# Patient Record
Sex: MALE | Race: WHITE | NOT HISPANIC OR LATINO | URBAN - METROPOLITAN AREA
[De-identification: names, ages, dates, MRNs, and addresses within clinical notes are randomized per-mention and may not be internally consistent; named-entity substitution may affect disease eponyms.]

---

## 2018-09-27 VITALS
OXYGEN SATURATION: 96 % | DIASTOLIC BLOOD PRESSURE: 50 MMHG | DIASTOLIC BLOOD PRESSURE: 74 MMHG | TEMPERATURE: 98.8 F | DIASTOLIC BLOOD PRESSURE: 68 MMHG | HEART RATE: 81 BPM | DIASTOLIC BLOOD PRESSURE: 81 MMHG | OXYGEN SATURATION: 98 % | HEART RATE: 65 BPM | HEART RATE: 61 BPM | HEART RATE: 55 BPM | SYSTOLIC BLOOD PRESSURE: 117 MMHG | OXYGEN SATURATION: 97 % | SYSTOLIC BLOOD PRESSURE: 116 MMHG | HEART RATE: 57 BPM | SYSTOLIC BLOOD PRESSURE: 125 MMHG | SYSTOLIC BLOOD PRESSURE: 131 MMHG | HEART RATE: 54 BPM | OXYGEN SATURATION: 99 % | DIASTOLIC BLOOD PRESSURE: 65 MMHG | HEART RATE: 60 BPM | SYSTOLIC BLOOD PRESSURE: 142 MMHG | HEART RATE: 58 BPM | DIASTOLIC BLOOD PRESSURE: 75 MMHG | WEIGHT: 250 LBS | SYSTOLIC BLOOD PRESSURE: 132 MMHG | RESPIRATION RATE: 16 BRPM | TEMPERATURE: 96.2 F | RESPIRATION RATE: 14 BRPM | OXYGEN SATURATION: 94 % | DIASTOLIC BLOOD PRESSURE: 78 MMHG | SYSTOLIC BLOOD PRESSURE: 140 MMHG | DIASTOLIC BLOOD PRESSURE: 60 MMHG | RESPIRATION RATE: 15 BRPM | DIASTOLIC BLOOD PRESSURE: 66 MMHG | HEART RATE: 68 BPM | SYSTOLIC BLOOD PRESSURE: 121 MMHG

## 2018-09-28 ENCOUNTER — AMBULATORY SURGICAL CENTER (AMBULATORY)
Dept: URBAN - METROPOLITAN AREA SURGERY 17 | Facility: SURGERY | Age: 53
End: 2018-09-28
Payer: COMMERCIAL

## 2018-09-28 ENCOUNTER — OFFICE (AMBULATORY)
Dept: URBAN - METROPOLITAN AREA PATHOLOGY 4 | Facility: PATHOLOGY | Age: 53
End: 2018-09-28
Payer: COMMERCIAL

## 2018-09-28 DIAGNOSIS — D12.2 BENIGN NEOPLASM OF ASCENDING COLON: ICD-10-CM

## 2018-09-28 DIAGNOSIS — Z80.0 FAMILY HISTORY OF MALIGNANT NEOPLASM OF DIGESTIVE ORGANS: ICD-10-CM

## 2018-09-28 DIAGNOSIS — Z86.010 PERSONAL HISTORY OF COLONIC POLYPS: ICD-10-CM

## 2018-09-28 DIAGNOSIS — Z83.71 FAMILY HISTORY OF COLONIC POLYPS: ICD-10-CM

## 2018-09-28 DIAGNOSIS — K63.3 ULCER OF INTESTINE: ICD-10-CM

## 2018-09-28 DIAGNOSIS — K57.32 DIVERTICULITIS OF LARGE INTESTINE WITHOUT PERFORATION OR ABS: ICD-10-CM

## 2018-09-28 DIAGNOSIS — K57.30 DIVERTICULOSIS OF LARGE INTESTINE WITHOUT PERFORATION OR ABS: ICD-10-CM

## 2018-09-28 LAB
GI HISTOLOGY: A. SELECT: (no result)
GI HISTOLOGY: B. SELECT: (no result)
GI HISTOLOGY: PDF REPORT: (no result)

## 2018-09-28 PROCEDURE — 45380 COLONOSCOPY AND BIOPSY: CPT | Performed by: INTERNAL MEDICINE

## 2018-09-28 PROCEDURE — 88305 TISSUE EXAM BY PATHOLOGIST: CPT | Mod: 33 | Performed by: INTERNAL MEDICINE

## 2018-09-28 RX ORDER — CIPROFLOXACIN 500 MG/1
1000 TABLET, FILM COATED ORAL
Qty: 14 | Refills: 0 | Status: COMPLETED
Start: 2018-09-28 | End: 2023-09-29

## 2018-09-28 RX ORDER — METRONIDAZOLE 500 MG/1
1500 TABLET, FILM COATED ORAL
Qty: 21 | Refills: 0 | Status: COMPLETED
Start: 2018-09-28 | End: 2023-09-29

## 2018-09-28 NOTE — SERVICEHPINOTES
9/28/2018  This is an established patient in the practice who presents for a "bypass" colonoscopy for purposes of polyp surveillance, family history of colon polyp and family history of colon cancer.BRUpdated new patient forms reviewed.BR53 yo M with h/o gout, HTN?, colon polyps, diverticulosis. mother with colon polyps age 70s. Father with liver ca. Grandfather with colon ca age 70s. no GI symptomsPre-operatively, I reviewed the indication(s) for the procedure, the risks of the procedure [including but not limited to: unexpected bleeding possibly requiring hospitalization and/or an unplanned repeat colonoscopy, perforation possibly requiring surgical treatment, missed lesions and complications of sedation/MAC [also explained by anesthesia staff]. Since this patient has had prior colonoscopic and/or endoscopic procedures, these risks were familiar to the patient.  Multiple opportunities were provided for any questions or concerns, and all questions were answered satisfactorily before any anesthesia administered.

## 2023-09-26 VITALS
SYSTOLIC BLOOD PRESSURE: 94 MMHG | OXYGEN SATURATION: 98 % | HEART RATE: 66 BPM | DIASTOLIC BLOOD PRESSURE: 59 MMHG | DIASTOLIC BLOOD PRESSURE: 42 MMHG | DIASTOLIC BLOOD PRESSURE: 48 MMHG | WEIGHT: 260 LBS | DIASTOLIC BLOOD PRESSURE: 39 MMHG | DIASTOLIC BLOOD PRESSURE: 58 MMHG | SYSTOLIC BLOOD PRESSURE: 135 MMHG | SYSTOLIC BLOOD PRESSURE: 117 MMHG | RESPIRATION RATE: 13 BRPM | HEART RATE: 51 BPM | RESPIRATION RATE: 16 BRPM | OXYGEN SATURATION: 99 % | DIASTOLIC BLOOD PRESSURE: 50 MMHG | HEART RATE: 56 BPM | TEMPERATURE: 97 F | HEART RATE: 63 BPM | OXYGEN SATURATION: 97 % | SYSTOLIC BLOOD PRESSURE: 148 MMHG | HEART RATE: 61 BPM | RESPIRATION RATE: 11 BRPM | DIASTOLIC BLOOD PRESSURE: 82 MMHG | OXYGEN SATURATION: 96 % | HEART RATE: 54 BPM | OXYGEN SATURATION: 91 % | SYSTOLIC BLOOD PRESSURE: 132 MMHG | SYSTOLIC BLOOD PRESSURE: 85 MMHG | SYSTOLIC BLOOD PRESSURE: 96 MMHG | HEART RATE: 58 BPM | RESPIRATION RATE: 12 BRPM | RESPIRATION RATE: 18 BRPM | DIASTOLIC BLOOD PRESSURE: 78 MMHG | SYSTOLIC BLOOD PRESSURE: 89 MMHG | DIASTOLIC BLOOD PRESSURE: 77 MMHG | TEMPERATURE: 97.5 F | OXYGEN SATURATION: 95 % | HEART RATE: 57 BPM

## 2023-09-29 ENCOUNTER — OFFICE (AMBULATORY)
Dept: URBAN - METROPOLITAN AREA PATHOLOGY 4 | Facility: PATHOLOGY | Age: 58
End: 2023-09-29
Payer: COMMERCIAL

## 2023-09-29 ENCOUNTER — AMBULATORY SURGICAL CENTER (AMBULATORY)
Dept: URBAN - METROPOLITAN AREA SURGERY 17 | Facility: SURGERY | Age: 58
End: 2023-09-29
Payer: COMMERCIAL

## 2023-09-29 DIAGNOSIS — K52.9 NONINFECTIVE GASTROENTERITIS AND COLITIS, UNSPECIFIED: ICD-10-CM

## 2023-09-29 DIAGNOSIS — Z86.010 PERSONAL HISTORY OF COLONIC POLYPS: ICD-10-CM

## 2023-09-29 DIAGNOSIS — K63.5 POLYP OF COLON: ICD-10-CM

## 2023-09-29 DIAGNOSIS — K57.30 DIVERTICULOSIS OF LARGE INTESTINE WITHOUT PERFORATION OR ABS: ICD-10-CM

## 2023-09-29 DIAGNOSIS — Z09 ENCOUNTER FOR FOLLOW-UP EXAMINATION AFTER COMPLETED TREATMEN: ICD-10-CM

## 2023-09-29 DIAGNOSIS — Z83.71 FAMILY HISTORY OF COLONIC POLYPS: ICD-10-CM

## 2023-09-29 DIAGNOSIS — K63.89 OTHER SPECIFIED DISEASES OF INTESTINE: ICD-10-CM

## 2023-09-29 LAB
GI HISTOLOGY: A. UNSPECIFIED: (no result)
GI HISTOLOGY: B. UNSPECIFIED: (no result)
GI HISTOLOGY: PDF REPORT: (no result)

## 2023-09-29 PROCEDURE — 88305 TISSUE EXAM BY PATHOLOGIST: CPT | Performed by: INTERNAL MEDICINE

## 2023-09-29 PROCEDURE — 45380 COLONOSCOPY AND BIOPSY: CPT | Mod: 33 | Performed by: INTERNAL MEDICINE

## 2023-09-29 NOTE — SERVICEHPINOTES
BLUE DENNIS  is a  58  male   who presents today for a  Colonoscopy   for   the indications listed below. The updated Patient Profile was reviewed prior to the procedure, in conjunction with the Physical Exam, including medical conditions, surgical procedures, medications, allergies, family history and social history. See Physical Exam time stamp below for date and time of HPI completion.Pre-operatively, I reviewed the indication(s) for the procedure, the risks of the procedure [including but not limited to: unexpected bleeding possibly requiring hospitalization and/or unplanned repeat procedures, perforation possibly requiring surgical treatment, missed lesions and complications of sedation/MAC (also explained by anesthesia staff)]. I have evaluated the patient for risks associated with the planned anesthesia and the procedure to be performed and find the patient an acceptable candidate for IV sedation.Multiple opportunities were provided for any questions or concerns, and all questions were answered satisfactorily before any anesthesia was administered. We will proceed with the planned procedure.br

## 2024-06-10 NOTE — PROGRESS NOTES
New Knee      Patient: Harvey Llanes        YOB: 1965    Medical Record Number: [id number]        Chief Complaints:   Left knee pain    History of Present Illness: This is a 59-year-old male who presents complaining of left knee pain he states has been ongoing about 4 weeks its mostly stiff if he sits for period of time he gets up at stiff it is primarily anterior no history of injury but he notices a lot when he is playing golf no night pain again his pain is primarily anterior        Allergies: No Known Allergies    Medications:   Home Medications:  Current Outpatient Medications on File Prior to Visit   Medication Sig    allopurinol (ZYLOPRIM) 300 MG tablet Take 1 tablet by mouth Daily.    hydroCHLOROthiazide 25 MG tablet Take 1 tablet by mouth Daily.    multivitamin with minerals tablet tablet Take 1 tablet by mouth Daily.    potassium chloride 10 MEQ CR tablet Take 1 tablet by mouth Daily.    vitamin B-12 (CYANOCOBALAMIN) 1000 MCG tablet Take 1 tablet by mouth Daily.     No current facility-administered medications on file prior to visit.     Current Medications:  Scheduled Meds:  Continuous Infusions:No current facility-administered medications for this visit.    PRN Meds:.    Past Medical History:   Diagnosis Date    Tendinitis of knee 2012    Patellar Tendon      No past surgical history on file.     Social History     Occupational History    Not on file   Tobacco Use    Smoking status: Never    Smokeless tobacco: Not on file   Substance and Sexual Activity    Alcohol use: Not Currently     Alcohol/week: 1.0 standard drink of alcohol     Types: 1 Cans of beer per week    Drug use: Never    Sexual activity: Not Currently     Partners: Female     Birth control/protection: None, Same-sex partner      Social History     Social History Narrative    Not on file        Family History   Problem Relation Age of Onset    Cancer Father         86 years old             Review of Systems:     Review of  "Systems      Physical Exam: 59 y.o. male  General Appearance:    Alert, cooperative, in no acute distress                   Vitals:    06/11/24 1251   Temp: 98.6 °F (37 °C)   Weight: 121 kg (267 lb 8 oz)   Height: 185.4 cm (73\")   PainSc:   7      Patient is alert and read ×3 no acute distress appears her above-listed at height weight and age.  Affect is normal respiratory rate is normal unlabored. Heart rate regular rate rhythm, sclera, dentition and hearing are normal for the purpose of this exam.        Ortho Exam  Physical exam of the left knee reveals no effusion, no erythema.  The patient has no palpable tenderness along the medial joint line, no tenderness about the lateral joint line.  Patient does have crepitus with patellofemoral range of motion.  They also have subjective symptoms anteriorly during exam.  The patient has a negative bounce home, negative Michael and a stable ligamentous exam.  Quad tone is reasonable and symmetric.  There are no overlying skin changes no lymphedema no lymphadenopathy.  There is good hip range of motion which is full symmetric and asymptomatic and a normal ankle exam.  Hamstrings and IT band are tight bilaterally.   Large Joint Arthrocentesis: L knee  Date/Time: 6/11/2024 1:22 PM  Consent given by: patient  Site marked: site marked  Timeout: Immediately prior to procedure a time out was called to verify the correct patient, procedure, equipment, support staff and site/side marked as required   Supporting Documentation  Indications: pain   Procedure Details  Location: knee - L knee  Preparation: Patient was prepped and draped in the usual sterile fashion  Needle gauge: 21G.  Medications administered: 1 mL methylPREDNISolone acetate 80 MG/ML; 2 mL lidocaine PF 1% 1 %  Patient tolerance: patient tolerated the procedure well with no immediate complications                 Radiology:   AP, Lateral and merchant views of the left knee  were ordered/reviewed to evauateknee pain. " He has good maintenance with joint space medially and laterally he has moderate patellofemoral OA he has a benign lesion in the distal aspect of his left femur have no comparative films  Imaging Results (Most Recent)       Procedure Component Value Units Date/Time    XR Knee 3 View Left [[id number]] Resulted: 06/11/24 1242     Updated: 06/11/24 1242    Impression:      Ordering physician's impression is located in the Encounter Note dated 06/11/24. X-ray performed in the DR room.            Assessment/Plan:    Left knee pain I think this is mostly patellofemoral although meniscal pathology remains in my differential I think it is lower though.  Plan is to proceed with an injection as a diagnostic and therapeutic tool I will start him on some meloxicam with strict precautions for short period of time and then as important I think is to have him see physical therapy working on quad and core strengthening we talked about maintaining an ideal body weight should he fail this would consider other means of testing to rule out meniscus tear

## 2024-06-11 ENCOUNTER — OFFICE VISIT (OUTPATIENT)
Dept: ORTHOPEDIC SURGERY | Facility: CLINIC | Age: 59
End: 2024-06-11
Payer: COMMERCIAL

## 2024-06-11 VITALS — WEIGHT: 267.5 LBS | HEIGHT: 73 IN | TEMPERATURE: 98.6 F | BODY MASS INDEX: 35.45 KG/M2

## 2024-06-11 DIAGNOSIS — M17.10 PATELLOFEMORAL ARTHRITIS: ICD-10-CM

## 2024-06-11 DIAGNOSIS — R52 PAIN: Primary | ICD-10-CM

## 2024-06-11 RX ORDER — POTASSIUM CHLORIDE 750 MG/1
10 TABLET, FILM COATED, EXTENDED RELEASE ORAL DAILY
COMMUNITY
Start: 2024-05-06

## 2024-06-11 RX ORDER — HYDROCHLOROTHIAZIDE 25 MG/1
25 TABLET ORAL DAILY
COMMUNITY
Start: 2024-05-06

## 2024-06-11 RX ORDER — MULTIPLE VITAMINS W/ MINERALS TAB 9MG-400MCG
1 TAB ORAL DAILY
COMMUNITY

## 2024-06-11 RX ORDER — ALLOPURINOL 300 MG/1
300 TABLET ORAL DAILY
COMMUNITY
Start: 2024-05-06

## 2024-06-11 RX ORDER — LANOLIN ALCOHOL/MO/W.PET/CERES
1000 CREAM (GRAM) TOPICAL DAILY
COMMUNITY

## 2024-06-11 RX ORDER — MELOXICAM 15 MG/1
TABLET ORAL
Qty: 30 TABLET | Refills: 0 | Status: SHIPPED | OUTPATIENT
Start: 2024-06-11

## 2024-06-11 RX ADMIN — LIDOCAINE HYDROCHLORIDE 2 ML: 10 INJECTION, SOLUTION EPIDURAL; INFILTRATION; INTRACAUDAL; PERINEURAL at 13:22

## 2024-06-11 RX ADMIN — METHYLPREDNISOLONE ACETATE 1 ML: 80 INJECTION, SUSPENSION INTRA-ARTICULAR; INTRALESIONAL; INTRAMUSCULAR; SOFT TISSUE at 13:22

## 2024-06-12 RX ORDER — LIDOCAINE HYDROCHLORIDE 10 MG/ML
2 INJECTION, SOLUTION EPIDURAL; INFILTRATION; INTRACAUDAL; PERINEURAL
Status: COMPLETED | OUTPATIENT
Start: 2024-06-11 | End: 2024-06-11

## 2024-06-12 RX ORDER — METHYLPREDNISOLONE ACETATE 80 MG/ML
1 INJECTION, SUSPENSION INTRA-ARTICULAR; INTRALESIONAL; INTRAMUSCULAR; SOFT TISSUE
Status: COMPLETED | OUTPATIENT
Start: 2024-06-11 | End: 2024-06-11

## 2024-06-27 NOTE — PROGRESS NOTES
Physical Therapy Initial Evaluation and Plan of Care    [facility] [place] [address]  [phone] (phone)  [phone] (fax)      Patient: Harvey Llanes   : 1965  Diagnosis/ICD-10 Code:  Patellar tendon strain, left, subsequent encounter [S86.812D]  Referring practitioner: Kisha Bowden MD  Date of Initial Visit: 2024  Today's Date: 2024  Patient seen for 1 sessions    Visit Diagnoses:    ICD-10-CM ICD-9-CM   1. Patellar tendon strain, left, subsequent encounter  S86.812D V58.89     844.8   2. Acute pain of left knee  M25.562 719.46              Subjective Evaluation    History of Present Illness  Date of surgery: 2024  Mechanism of injury: Pt presents to physical therapy with c/o L knee pain, which began in early May. About 4-5 years ago, pt was having pain in the front of his knee. He got diagnosed with patellar tendon strain. He was given a shot in the front of his knee which helped tremendously. He didn't have issues for years. Then, around 2 months ago, pt was playing golf and over time the pain in his L knee started increasing. After he golfed, he sat for a long period of time (15-20 min) driving which made his knee stiff. Now, he feels when he sits for a longer period of time with knee bend, his knee gets stiff and feels like it needs to pop. He can get it to pop by straightening his knee and/or getting up--which is painful (sharp) but then it goes away. The pain is located inferomedial knee. Denies buckling or weakness. The pain is aggravated with sitting for a long period of time, twisting with golf swing, and some soreness with yard work. Denies issues with going up and down stairs. It is eased by Voltaren. He was also given a shot which eased his pain. Denies N&T.    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  Progression: improved    Social Support  Lives in: multiple-level home  Lives with: spouse    Diagnostic Tests  X-ray: abnormal (OA)    Treatments  Previous treatment: injection treatment  Current treatment: physical therapy  Patient Goals  Patient goals for therapy: decreased pain, increased strength and return to sport/leisure activities  Patient goal: being able to comfortably and confidently hit a golf ball           Objective          Strength/Myotome Testing     Left Hip   Planes of Motion   Flexion: 5  Extension: 4+  Abduction: 4+    Right Hip   Planes of Motion   Flexion: 4+  Extension: 4  Abduction: 4+    Left Knee   Flexion: 4+  Extension: 4+ (a little bit pain in knee)    Right Knee   Flexion: 4  Extension: 5    Left Ankle/Foot   Dorsiflexion: 5    Right Ankle/Foot   Dorsiflexion: 5    Left Knee Flexibility Comments:   Mod to sev stiffness of hamstring and quad B     Ambulation     Comments   Pt demonstrates R lateral trunk lean, some compensated R sided trendelenberg, B genu varus        See Exercise, Manual, and Modality Logs for complete treatment.       Functional Outcome Score: KOS-ADL: 44% functional ability (56% disability)    Performed following exercises and instructed pt in proper form for HEP:  HS stretch green strap 30 sec x2 B  Quad stretch green strap 30 sec x2 B    Instructed pt to wear brace and ice daily.    Assessment & Plan       Assessment  Impairments: abnormal gait, abnormal or restricted ROM, activity intolerance, impaired balance, impaired physical strength, lacks appropriate home exercise program and pain with function   Functional limitations: sleeping, walking, uncomfortable because of pain, moving  in bed, standing and stooping   Assessment details: Pt is a 59 y.o. male who presents to physical therapy with c/o acute L knee pain.  he presents with a evolving (improving) clinical presentation. he has comorbidities of hx of lumbar pain and personal factors of having a sedentary lifestyle that may affect his progress in the plan of care. Signs and symptoms are consistent with physical therapy diagnosis of L patellar tendon strain. Pt also demonstrates BLE flexibility deficits, BLE weakness, gait deviation, crepitus/popping of L knee, and pain/stiffness with prolonged sitting.  Prognosis: good    Goals  Plan Goals: STGs to be completed in 30 days  1. Pt will demonstrate independence with home exercise program for knee strengthening/mobility.  2. Pt will demonstrate improved gross LE strength to at least a 5/5 in order to promote decreased stress/tension through patellar tendon.      LTGs to be completed in 90 days  1. Pt will improve score on KOS-ADL by 20% function (64% functional ability) in order to demonstrate ease with ADLs including navigating stairs and ambulation.  3. Pt will be able to comfortably and confidently hit a golf ball at 100% effort with less than 2/10 knee pain in order to facilitate return to golf for recreation.     Plan  Therapy options: will be seen for skilled therapy services  Planned modality interventions: thermotherapy (hydrocollator packs), TENS, cryotherapy, ultrasound and high voltage pulsed current (dermal wound therapy)  Planned therapy interventions: abdominal trunk stabilization, manual therapy, neuromuscular re-education, ADL retraining, flexibility, functional ROM exercises, strengthening, therapeutic activities, gait training, home exercise program and transfer training  Frequency: 2x week  Duration in weeks: 12  Treatment plan discussed with: patient        Timed:         Manual Therapy:    0     mins  55311;     Therapeutic Exercise:    0     mins  19198;     Neuromuscular  James:    0    mins  51822;    Therapeutic Activity:     10     mins  80985;     Gait Trainin     mins  25941;     Ultrasound:     0     mins  57883;    Self Care                       0     mins   03166      Un-Timed:  Electrical Stimulation:    0     mins  61734 ( );  Dry Needling  (1-2 muscles)            0     mins 80482 (Self-pay)  Dry Needling (3-4 muscles) 0     mins 48905 (Self-pay)  Dry Needling Trial    0     mins DRYNDLTRIAL  (No Charge)  Traction     0     mins 00078  Low Eval     0     Mins  83605  Mod Eval     33     Mins  60066  High Eval                       0     Mins  73887    Timed Treatment:   10   mins   Total Treatment:     43   mins    PT SIGNATURE: [place] License Number: [id number]    Electronically signed by Rachel Estrada PT, 24, 10:29 AM EDT    DATE TREATMENT INITIATED: 2024    Initial Certification  Certification Period: 2024  I certify that the therapy services are furnished while this patient is under my care.  The services outlined above are required by this patient, and will be reviewed every 90 days.     PHYSICIAN: Kisha Bowden MD   NPI: [id number]                                         DATE:     Please sign and return via fax to [phone] Thank you, [facility].

## 2024-06-28 ENCOUNTER — TREATMENT (OUTPATIENT)
Dept: PHYSICAL THERAPY | Facility: CLINIC | Age: 59
End: 2024-06-28
Payer: COMMERCIAL

## 2024-06-28 DIAGNOSIS — S86.812D PATELLAR TENDON STRAIN, LEFT, SUBSEQUENT ENCOUNTER: Primary | ICD-10-CM

## 2024-06-28 DIAGNOSIS — M25.562 ACUTE PAIN OF LEFT KNEE: ICD-10-CM

## 2024-07-01 ENCOUNTER — TELEPHONE (OUTPATIENT)
Dept: PHYSICAL THERAPY | Facility: CLINIC | Age: 59
End: 2024-07-01
Payer: COMMERCIAL

## 2024-07-01 NOTE — TELEPHONE ENCOUNTER
Caller: Harvey Llanes    Relationship: Self       What was the call regarding: IS CX THE APPT FOR THIS FRIDAY WANTED TO ADD IT ON TO THE END, WE WERE TRYING 8-28 THE WED  IT WILL NOT LET ME SCHEDULE, CAN YOU PLEASE SCHEDULE, IF AN ISSUE CALL HIM. TRIED TO CALL OFFICE

## 2024-07-20 NOTE — PROGRESS NOTES
"Physical Therapy Daily Treatment Note    [facility]  [address] [place] [address]  [phone] (phone)  [phone] (fax)    Patient: Harvey Llanes   : 1965  Diagnosis/ICD-10 Code:  Patellar tendon strain, left, subsequent encounter [S86.812D]  Referring practitioner: Kisha Bowden MD  Today's Date: 2024  Patient seen for 2 sessions    Visit Diagnoses:    ICD-10-CM ICD-9-CM   1. Patellar tendon strain, left, subsequent encounter  S86.812D V58.89     844.8   2. Acute pain of left knee  M25.562 719.46              Subjective   Pt states that he tweaked his back a couple of days after the eval. He said his knee feels tight today because he did yard work yesterday using a \"roto-tiller\".     Objective   See Exercise, Manual, and Modality Logs for complete treatment.     THEREX/THERACT/NEURO  - Ambulation x2 laps  - Clamshells RTB 3x10  - SAQ black ankle weight 3 sec hold ecc phase x10 B  - SLRs w/ quad set 2x10  - Bridges 2x10       Assessment/Plan  Today was pt's first treatment session following initial eval. Instructed pt in proper form for all exercises. Pt was given printout of new HEP and instructed in appropriate frequency + form. Continue PT POC.          Timed:    Manual Therapy:    0     mins  01960;  Therapeutic Exercise:    23     mins  39352;     Neuromuscular James:    10    mins  87168;    Therapeutic Activity:     10     mins  54483;     Gait Trainin     mins  55940;     Ultrasound:     0     mins  41070;    Aquatic Therapy    0     mins 98740;  Self Care                       0     mins   42114        Untimed:  Electrical Stimulation:    0     mins  44173 (MC );  Traction:    0     mins  32192;   Dry Needling  (1-2 muscles)            0     mins  (Self-pay)  Dry Needling (3-4 muscles) 0      (Self-pay)  Dry Needling Trial    0     DRYNDLTRIAL  (No Charge)    Timed Treatment:   43   mins   Total Treatment:     43   " heather Estrada, PT  Physical Therapist    [place] License:[id number]

## 2024-07-22 ENCOUNTER — TREATMENT (OUTPATIENT)
Dept: PHYSICAL THERAPY | Facility: CLINIC | Age: 59
End: 2024-07-22
Payer: COMMERCIAL

## 2024-07-22 DIAGNOSIS — S86.812D PATELLAR TENDON STRAIN, LEFT, SUBSEQUENT ENCOUNTER: Primary | ICD-10-CM

## 2024-07-22 DIAGNOSIS — M25.562 ACUTE PAIN OF LEFT KNEE: ICD-10-CM

## 2024-07-22 PROCEDURE — 97110 THERAPEUTIC EXERCISES: CPT

## 2024-07-22 PROCEDURE — 97530 THERAPEUTIC ACTIVITIES: CPT

## 2024-07-22 PROCEDURE — 97112 NEUROMUSCULAR REEDUCATION: CPT

## 2024-07-26 ENCOUNTER — TREATMENT (OUTPATIENT)
Dept: PHYSICAL THERAPY | Facility: CLINIC | Age: 59
End: 2024-07-26
Payer: COMMERCIAL

## 2024-07-26 DIAGNOSIS — S86.812D PATELLAR TENDON STRAIN, LEFT, SUBSEQUENT ENCOUNTER: Primary | ICD-10-CM

## 2024-07-26 DIAGNOSIS — M25.562 ACUTE PAIN OF LEFT KNEE: ICD-10-CM

## 2024-07-26 NOTE — PROGRESS NOTES
Physical Therapy Daily Treatment Note    [facility]  [address] [place] [address]  [phone] (phone)  [phone] (fax)    Patient: Harvey Llanes   : 1965  Diagnosis/ICD-10 Code:  Patellar tendon strain, left, subsequent encounter [S86.812D]  Referring practitioner: Kisha Bowden MD  Today's Date: 2024  Patient seen for 3 sessions    Visit Diagnoses:    ICD-10-CM ICD-9-CM   1. Patellar tendon strain, left, subsequent encounter  S86.812D V58.89     844.8   2. Acute pain of left knee  M25.562 719.46              Subjective   Pt states he wasn't sore or in pain after his first session.     Objective   See Exercise, Manual, and Modality Logs for complete treatment.     THEREX/THERACT/NEURO  - Recumbent bike x5 min  - Leg press K 120# 2x10 140# x10  - K Hip ABD 60# x10, 65# x10, 70# x10  - Step Ups, 1 step, forward and lat  - Heel slides x10--discussed doing while sitting throughout day to reduce tightness/popping that occurs with prolonged sitting  - SLRs w/ quad set x10, black weight 2x10   - Quad stretch 30 sec x2 w/ green strap    Defer  - Clamshells RTB 3x10  - SAQ black ankle weight 3 sec hold ecc phase x10 B  - Bridges 2x10    Assessment/Plan  Pt tolerated session well overall, reporting an appropriate level of muscle fatigue following strength exercises. Added resistance machines to further strengthen glute max, glute med, and quads. Also discussed adding heel slides when sitting for a long period of time to reduce stiffness in knee and decrease crepitus. Continue PT POC.          Timed:    Manual Therapy:    0     mins  86750;  Therapeutic Exercise:    8     mins  01580;     Neuromuscular James:    23    mins  93401;    Therapeutic Activity:     10     mins  91780;     Gait Trainin     mins  90714;     Ultrasound:     0     mins  70123;    Aquatic Therapy    0     mins 89631;  Self Care                       0     mins    74296        Untimed:  Electrical Stimulation:    0     mins  17886 ( );  Traction:    0     mins  74728;   Dry Needling  (1-2 muscles)            0     mins 20560 (Self-pay)  Dry Needling (3-4 muscles) 0     20561 (Self-pay)  Dry Needling Trial    0     DRYNDLTRIAL  (No Charge)    Timed Treatment:   41   mins   Total Treatment:     41   mins    Rachel Estrada PT  Physical Therapist    [place] License:[id number]

## 2024-07-29 ENCOUNTER — TREATMENT (OUTPATIENT)
Dept: PHYSICAL THERAPY | Facility: CLINIC | Age: 59
End: 2024-07-29
Payer: COMMERCIAL

## 2024-07-29 DIAGNOSIS — M25.562 ACUTE PAIN OF LEFT KNEE: ICD-10-CM

## 2024-07-29 DIAGNOSIS — S86.812D PATELLAR TENDON STRAIN, LEFT, SUBSEQUENT ENCOUNTER: Primary | ICD-10-CM

## 2024-07-29 PROCEDURE — 97110 THERAPEUTIC EXERCISES: CPT

## 2024-07-29 PROCEDURE — 97112 NEUROMUSCULAR REEDUCATION: CPT

## 2024-07-29 PROCEDURE — 97530 THERAPEUTIC ACTIVITIES: CPT

## 2024-07-29 NOTE — PROGRESS NOTES
Physical Therapy Daily Treatment Note    [facility]  [address] [place] [address]  [phone] (phone)  [phone] (fax)    Patient: Harvey Llanes   : 1965  Diagnosis/ICD-10 Code:  Patellar tendon strain, left, subsequent encounter [S86.812D]  Referring practitioner: Kisha Bowden MD  Today's Date: 2024  Patient seen for 4 sessions    Visit Diagnoses:    ICD-10-CM ICD-9-CM   1. Patellar tendon strain, left, subsequent encounter  S86.812D V58.89     844.8   2. Acute pain of left knee  M25.562 719.46              Subjective   Pt reports that he hasn't had pain     Objective   See Exercise, Manual, and Modality Logs for complete treatment.     THEREX/THERACT/NEURO  - Recumbent bike x5 min  - Leg press K 120# 2x10 140# x10  - K Hip ABD 60# x10, 65# x10, 70# x10  - Step Ups, 1 step, forward and lat  - Clamshells GTB 3x10  - Bridges 2x10  - Sidelying hip ABD--w/ IR of leg, x8  - Figure 4 AND piriformis stretch, 20 sec x2 B      Defer  - SAQ black ankle weight 3 sec hold ecc phase x10 B  - Heel slides x10--discussed doing while sitting throughout day to reduce tightness/popping that occurs with prolonged sitting  - SLRs w/ quad set x10, black weight 2x10   - Quad stretch 30 sec x2 w/ green strap    Assessment/Plan  Pt tolerated session well overall, reporting an appropriate level of muscle fatigue following strength exercises. Added hip ABD with IR of LE to strengthen glute med. Required min vcs to lead with heel and keep leg straight, which he was able to correct. Pan to add STS and teach segmental bridges next session. Continue PT POC.          Timed:    Manual Therapy:    0     mins  23031;  Therapeutic Exercise:    23     mins  74857;     Neuromuscular James:    10    mins  31729;    Therapeutic Activity:     10     mins  82930;     Gait Trainin     mins  11105;     Ultrasound:     0     mins  21905;    Aquatic Therapy    0     mins  12724;  Self Care                       0     mins   18303        Untimed:  Electrical Stimulation:    0     mins  97817 ( );  Traction:    0     mins  82936;   Dry Needling  (1-2 muscles)            0     mins 20560 (Self-pay)  Dry Needling (3-4 muscles) 0     20561 (Self-pay)  Dry Needling Trial    0     DRYNDLTRIAL  (No Charge)    Timed Treatment:   43   mins   Total Treatment:     43   mins    Rachel Estrada PT  Physical Therapist    [place] License:[id number]

## 2024-08-02 ENCOUNTER — TELEPHONE (OUTPATIENT)
Dept: PHYSICAL THERAPY | Facility: CLINIC | Age: 59
End: 2024-08-02

## 2024-08-05 NOTE — PROGRESS NOTES
Physical Therapy Daily Note    Patient Name: Harvey Llanes         :  1965  Referring Physician: Kisha Bowden MD  Treatment Date: 2024  Date of Initial Visit: No linked episodes    Visit Diagnoses:    ICD-10-CM ICD-9-CM   1. Patellar tendon strain, left, subsequent encounter  S86.812D V58.89     844.8   2. Acute pain of left knee  M25.562 719.46     Patient seen for Visit count could not be calculated. Make sure you are using a visit which is associated with an episode. sessions  _____________________________________________________    Subjective   Harvey Llanes reports: knee more (L) knee after a lot of yard work over weekend -  infrapatella into medial patella - popping after prolonged sitting when extending his knee - used to be painful, but not so much after injection -   H/o PAT tendon issues in past -     Objective   Tender patellar tendon into insertion -     TREATMENT:   MANUAL THERAPY:   STM  / DTM to patellar tendon and insertion (L) knee -     EXERCISES:   []   - Clamshells GTB 3x10  []   - Bridges 2x10  []   - Sidelying hip ABD--w/ IR of leg, x8  []   - Figure 4 AND piriformis stretch, 20 sec x2 B  [x]   - NUSTEP  [x]   - Leg press BEREKET 165 2x 15  [x]   - BEREKET Hip ABD 70# 2x 15  [x]   - RUNNER Step Ups, Hole 4 from bottom  [x]   - LATERAL STEP UP w/ ABDuction Hole 3 from bottom w/ facilitation to glute medius  [x]   -SLS 2 x 1 min ea  []   - BALANCE BOARD Frnt/Bk; Side/Side      NMR: Verbal and tactile cues to facilitate quad/VMO, Glute, Hip / LE musculature statically and dynamically. - Balance / proprioception activities -  -      Functional / Therapeutic Activities:    TAPING / BRACING: DISCUSSED PATELLAR TENDON STRAP and WEARING SCHEDULE  SEE EXERCISE FLOW SHEET -   Knee Assessment -       Assessment/Plan  60 yo male; (L) knee pain / patellar tendonitis  Improving overall -       Progress strengthening /stabilization /functional activity        _________________________________________________    Manual Therapy:            05     mins  40849;  Therapeutic Exercise:    10    mins  02265;     Neuromuscular James:   08     mins  83251;    Therapeutic Activity:     23      mins  19167;     Ultrasound:                          mins  22124;  Iontophoresis:                     mins  53912;    Electrical Stimulation:         mins  64965 ( );  Mechanical Traction:          mins  20099  Dry Needling                       mins self-pay     Timed Treatment:   46    mins                  Total Treatment:     46    mins        Harsh Kim, PT  Physical Therapist  Lic # [id number]    Access Code: [email or web address]  URL: [email or web address]  Date: 08/06/2024  Prepared by: Lakhwinder Kim    Exercises  - Modified Rafael Stretch  - 3 x daily - 7 x weekly - 1 sets - 2 reps - 1 min hold  - Supine Quadriceps Stretch with Strap on Table  - 1 x daily - 7 x weekly - 1 sets - 2 reps - 60 hold

## 2024-08-06 ENCOUNTER — TREATMENT (OUTPATIENT)
Dept: PHYSICAL THERAPY | Facility: CLINIC | Age: 59
End: 2024-08-06
Payer: COMMERCIAL

## 2024-08-06 DIAGNOSIS — M25.562 ACUTE PAIN OF LEFT KNEE: ICD-10-CM

## 2024-08-06 DIAGNOSIS — S86.812D PATELLAR TENDON STRAIN, LEFT, SUBSEQUENT ENCOUNTER: Primary | ICD-10-CM

## 2024-08-08 ENCOUNTER — TREATMENT (OUTPATIENT)
Dept: PHYSICAL THERAPY | Facility: CLINIC | Age: 59
End: 2024-08-08
Payer: COMMERCIAL

## 2024-08-08 DIAGNOSIS — S86.812D PATELLAR TENDON STRAIN, LEFT, SUBSEQUENT ENCOUNTER: Primary | ICD-10-CM

## 2024-08-08 DIAGNOSIS — M25.562 ACUTE PAIN OF LEFT KNEE: ICD-10-CM

## 2024-08-08 NOTE — PROGRESS NOTES
Physical Therapy Daily Note    Patient Name: Harvey Llanes         :  1965  Referring Physician: Kisha Bowden MD  Treatment Date: 2024  Date of Initial Visit: No linked episodes    Visit Diagnoses:    ICD-10-CM ICD-9-CM   1. Patellar tendon strain, left, subsequent encounter  S86.812D V58.89     844.8   2. Acute pain of left knee  M25.562 719.46     Patient seen for Visit count could not be calculated. Make sure you are using a visit which is associated with an episode. sessions  _____________________________________________________    Subjective   Harvey Llanes reports: improved strength and improved pain about 15-20% - Increased after activities - including yard work, etc - Upstairs > down stairs -   DTM helpful - stretching helpful   Pain at infrapatellar region / pat tendon region (L) - Notes intermittent popping of knee cap (L)     Objective   Tender infrapatellar region, pat tendon and into insertion - Inf tipped patella (L)     Strength/Myotome Testing      Left Hip   Planes of Motion   Flexion: 5  Extension: 4+  Abduction: 4+     Right Hip   Planes of Motion   Flexion: 4+  Extension: 4+  Abduction: 4+     Left Knee   Flexion: 4+  Extension: 4+ (a little bit pain in knee)     Right Knee   Flexion: 4+  Extension: 5    TREATMENT:   MANUAL THERAPY:   STM  / DTM to patellar tendon and insertion (L) knee -      EXERCISES:   []   - Clamshells GTB 3x10  []   - Bridges 2x10  []   - Sidelying hip ABD--w/ IR of leg, x8  []   - Figure 4 AND piriformis stretch, 20 sec x2 B  [x]   - NUSTEP  [x]   - Leg press BEREKET 165 2x 15  [x]   - BEREKET Hip ABD 70# 2x 15  [x]   - RUNNER Step Ups, Hole 4 from bottom  [x]   - LATERAL STEP UP w/ ABDuction Hole 3 from bottom w/ facilitation to glute medius  [x]   -SLS 2 x 1 min ea  []   - BALANCE BOARD Frnt/Bk; Side/Side        NMR: Verbal and tactile cues to facilitate quad/VMO, Glute, Hip / LE musculature statically and dynamically. - Balance / proprioception  activities -  -      Functional / Therapeutic Activities:    TAPING / BRACING: NA  K-Tape to Unload PF jt and Leukotape to address inferior patellar tilt to decrease pain and allow improved tolerance to PT activities and ADLs -   Functional assessment -   Jt protection, ADL modification; Posture and      Assessment/Plan  58 yo male; (L) knee pain / patellar tendonitis  Improving overall - but still limited functionally including squatting, stairs, golfing due to anterior knee pain -   Taping helpful in tolerating PT activities -   GOAL STATUS:    STGs   1. Pt will demonstrate independence with home exercise program for knee strengthening/mobility. - ONGOING  2. Pt will demonstrate improved gross LE strength to at least a 5/5 in order to promote decreased  stress/tension through patellar tendon.- - NOT MET        LTGs (met at time of discharge)  1. Pt will improve score on KOS-ADL by 20% function (64% functional ability) in order to demonstrate ease with ADLs including navigating stairs and ambulation.- MET  2. Pt will be able to comfortably and confidently hit a golf ball at 100% effort with less than 2/10 knee pain in order to facilitate return to golf for recreation.  - NOT MET  MODIFIED GOALS:   3) Pt able to perform LE ADLs including stair climbing, squatting, yard work w/ < 1/10 pain      Harvey would benefit from continued Physical Therapy to allow full and safe return to ADLs and hobby activities and job duties WNL -     Progress strengthening /stabilization /functional activity  2x / week x 6 weeks -          _________________________________________________    Manual Therapy:                 mins  00193;  Therapeutic Exercise:    15    mins  60910;     Neuromuscular James:   08     mins  30683;    Therapeutic Activity:     23      mins  25680;     Ultrasound:                          mins  91829;  Iontophoresis:                     mins  17776;    Electrical Stimulation:         mins  21358 (  );  Mechanical Traction:          mins  19563  Dry Needling                       mins self-pay     Timed Treatment:   46    mins                  Total Treatment:     46    mins        Harsh Kim PT  Physical Therapist  Lic # [id number]

## 2024-08-15 ENCOUNTER — TREATMENT (OUTPATIENT)
Dept: PHYSICAL THERAPY | Facility: CLINIC | Age: 59
End: 2024-08-15
Payer: COMMERCIAL

## 2024-08-15 DIAGNOSIS — S86.812D PATELLAR TENDON STRAIN, LEFT, SUBSEQUENT ENCOUNTER: Primary | ICD-10-CM

## 2024-08-15 DIAGNOSIS — M25.562 ACUTE PAIN OF LEFT KNEE: ICD-10-CM

## 2024-08-15 NOTE — PROGRESS NOTES
Physical Therapy Daily Note    Patient Name: Harvey Llanes         :  1965  Referring Physician: Kisha Bowden MD  Treatment Date: 8/15/2024  Date of Initial Visit: Type: THERAPY  Noted: 2024    Visit Diagnoses:    ICD-10-CM ICD-9-CM   1. Patellar tendon strain, left, subsequent encounter  S86.812D V58.89     844.8   2. Acute pain of left knee  M25.562 719.46     Patient seen for 6 sessions  _____________________________________________________    Subjective   Harvey Llanes reports: played golf Monday - swung freely - no pain in (L) knee - after golf - sat and relaxed and felt a bit stiff afterwards - No pain during -  stiff/sore in hips and (R) knee > (L) knee - used voltaren - Monday night and slept well -  Able to swing and hit ball w/o fear of pain -   Tuesday stiff / muscle soreness, but no knee pain - Wednesday felt better - doing exercises regularly -   Stairs at work - not as painful -        Objective   Good mobility and form w/ PT activities -     TREATMENT:   MANUAL THERAPY:   STM  / DTM to patellar tendon and insertion (L) knee -      EXERCISES:   [x]   SUPINE HF STRETCH off SIDE of BED x 2 min  [x]   SUPINE KNEE FLEXION/QUAD STRETCH w/ LEG OFF SIDE of BED  x 2 min  []   - Clamshells GTB 3x10  []   - Bridges 2x10  []   - Sidelying hip ABD--w/ IR of leg, x8  []   - Figure 4 AND piriformis stretch, 20 sec x2 B  [x]   - NUSTEP Seat 11 L8 x 10 min  [x]   - Leg press BEREKET 185 2x12  [x]   - BEREKET Hip ABD 75# 2x 12  [x]   - STAIRS UP/DOWN 2 FLIGHTS RECIPROCALLY Slow / Controlled x 2 ea  [x]   - LATERAL STEP UP / DOWN ON STAIRS 2 FLIGHTS R/L -   [x]   -SLS 2 x 1 min ea  [x]   - BALANCE BOARD Frnt/Bk; Side/Side 2 min ea        NMR: Verbal and tactile cues to facilitate quad/VMO, Glute, Hip / LE musculature statically and dynamically. - Balance / proprioception activities -  -      Functional / Therapeutic Activities:    TAPING / BRACING: NA  K-Tape to Unload PF jt and Leukotape to address  inferior patellar tilt to decrease pain and allow improved tolerance to PT activities and ADLs - DEFERRED TODAY  SEE EXERCISEs        Assessment/Plan  58 yo male; (L) knee pain / patellar tendonitis  Improving  with decreased pain and improved mobility and function     Progress strengthening /stabilization /functional activity       _________________________________________________    Manual Therapy:                 mins  04183;  Therapeutic Exercise:    20    mins  61307;     Neuromuscular James:   08     mins  96701;    Therapeutic Activity:     23      mins  24745;     Ultrasound:                          mins  85601;  Iontophoresis:                     mins  92421;    Electrical Stimulation:         mins  76710 ( );  Mechanical Traction:          mins  31960  Dry Needling                       mins self-pay     Timed Treatment:   51   mins                  Total Treatment:     51    mins        Harsh Kim, PT  Physical Therapist  Lic # [id number]

## 2024-08-26 ENCOUNTER — TREATMENT (OUTPATIENT)
Dept: PHYSICAL THERAPY | Facility: CLINIC | Age: 59
End: 2024-08-26
Payer: COMMERCIAL

## 2024-08-26 DIAGNOSIS — S86.812D PATELLAR TENDON STRAIN, LEFT, SUBSEQUENT ENCOUNTER: Primary | ICD-10-CM

## 2024-08-26 DIAGNOSIS — M25.562 ACUTE PAIN OF LEFT KNEE: ICD-10-CM

## 2024-08-26 NOTE — PROGRESS NOTES
Physical Therapy Daily Note    Patient Name: Harvey Llanes         :  1965  Referring Physician: Kisha Bowden MD  Treatment Date: 2024  Date of Initial Visit: Type: THERAPY  Noted: 2024    Visit Diagnoses:    ICD-10-CM ICD-9-CM   1. Patellar tendon strain, left, subsequent encounter  S86.812D V58.89     844.8   2. Acute pain of left knee  M25.562 719.46     Patient seen for 7 sessions  _____________________________________________________    Subjective   Harvey Llanes reports: played golf 3 times w/o pain / problems in [place] - played well - no problems w/ stairs - did a lot of walking in [place] -     Objective   Pt able to ambulate up/down 2 flights of stairs reciprocally w/o HR w/o pain -     TREATMENT:   MANUAL THERAPY:   STM  / DTM to patellar tendon and insertion (L) knee -      EXERCISES:   [x]   SUPINE HF STRETCH off SIDE of BED x 2 min  [x]   SUPINE KNEE FLEXION/QUAD STRETCH w/ LEG OFF SIDE of BED  x 2 min  []   - Clamshells GTB 3x10  []   - Bridges 2x10  []   - Sidelying hip ABD--w/ IR of leg, x8  []   - Figure 4 AND piriformis stretch, 20 sec x2 B  [x]   - NUSTEP Seat 11 L8 x 10 min  No arms  [x]   - Leg press BEREKET 185 2x12  [x]   - BEREKET Hip ABD 75# 2x 12  [x]   - STAIRS UP/DOWN 2 FLIGHTS RECIPROCALLY Slow / Controlled x 2 ea (w/ 10 squats at landing going UP )  [x]   - LATERAL STEP UP / DOWN ON STAIRS 2 FLIGHTS R/L - (w/ 10 squats at landing going UP )  [x]   -SLS 2 x 1 min ea  [x]   - BALANCE BOARD Frnt/Bk; Side/Side 2 min ea        NMR: Verbal and tactile cues to facilitate quad/VMO, Glute, Hip / LE musculature statically and dynamically. - Balance / proprioception activities -  -      Functional / Therapeutic Activities:    TAPING / BRACING: NA  K-Tape to Unload PF jt and Leukotape to address inferior patellar tilt to decrease pain and allow improved tolerance to PT activities and ADLs - DEFERRED TODAY  SEE EXERCISEs        Assessment/Plan  58 yo male; (L) knee pain /  patellar tendonitis  Improving  with decreased pain and improved mobility and function     Progress strengthening /stabilization /functional activity       _________________________________________________    Manual Therapy:                 mins  75059;  Therapeutic Exercise:    15    mins  51544;     Neuromuscular James:   08     mins  61087;    Therapeutic Activity:     23      mins  25907;     Ultrasound:                          mins  09388;  Iontophoresis:                     mins  79706;    Electrical Stimulation:         mins  16695 ( );  Mechanical Traction:          mins  76599  Dry Needling                       mins self-pay     Timed Treatment:   46  mins                  Total Treatment:     46   mins        Harsh Kim PT  Physical Therapist  Lic # [id number]

## 2024-10-15 ENCOUNTER — TELEPHONE (OUTPATIENT)
Dept: ORTHOPEDIC SURGERY | Facility: CLINIC | Age: 59
End: 2024-10-15

## 2024-10-15 NOTE — TELEPHONE ENCOUNTER
Caller: LAVON    Relationship: RUDDY Guerra call back number: [phone]  REFERENCE NUMBER [id number]      LAVON STATES SHE TALKED TO  BILLING, BILLING ADVISED HER POST AUTH WOULD NEED TO COME FROM PROVIDER FOR PHYSICAL THERAPY CLAIM JULY 22, 2024 - AUGUST 8, 2024 .

## 2024-10-30 NOTE — TELEPHONE ENCOUNTER
Hub staff attempted to follow warm transfer process and was unsuccessful     Caller: Harvey Llanes    Relationship to patient: Self    Best call back number: [phone]    Patient is needing: PT CALLING IN TO CHECK ON THIS SITUATION REGARDING FILING PHYSICAL THERAPY ORDER UNDER INCORRECT [employer] MEMBER ID. PLEASE CONTACT PT ASAP TO DISCUSS ANY UPDATES.     PT STATES THE NEW MEMBER ID IS [id number].

## 2024-10-31 NOTE — TELEPHONE ENCOUNTER
Spoke to patient again and told him the PT office get the approvals for PT.  He needs to call [facility] and speak with Janelle.  She is the one I have sent message to before on this.